# Patient Record
Sex: MALE | Race: BLACK OR AFRICAN AMERICAN | NOT HISPANIC OR LATINO | Employment: OTHER | URBAN - METROPOLITAN AREA
[De-identification: names, ages, dates, MRNs, and addresses within clinical notes are randomized per-mention and may not be internally consistent; named-entity substitution may affect disease eponyms.]

---

## 2024-04-08 ENCOUNTER — OFFICE VISIT (OUTPATIENT)
Dept: FAMILY MEDICINE | Facility: CLINIC | Age: 54
End: 2024-04-08
Payer: MEDICARE

## 2024-04-08 VITALS
WEIGHT: 179 LBS | DIASTOLIC BLOOD PRESSURE: 88 MMHG | SYSTOLIC BLOOD PRESSURE: 138 MMHG | HEART RATE: 105 BPM | OXYGEN SATURATION: 98 % | TEMPERATURE: 99 F | BODY MASS INDEX: 28.77 KG/M2 | HEIGHT: 66 IN

## 2024-04-08 DIAGNOSIS — Z63.6 CAREGIVER STRESS: ICD-10-CM

## 2024-04-08 DIAGNOSIS — Z12.5 SCREENING PSA (PROSTATE SPECIFIC ANTIGEN): ICD-10-CM

## 2024-04-08 DIAGNOSIS — R79.9 ABNORMAL FINDING OF BLOOD CHEMISTRY, UNSPECIFIED: ICD-10-CM

## 2024-04-08 DIAGNOSIS — R45.89 DYSPHORIC MOOD: ICD-10-CM

## 2024-04-08 DIAGNOSIS — Z76.89 ESTABLISHING CARE WITH NEW DOCTOR, ENCOUNTER FOR: Primary | ICD-10-CM

## 2024-04-08 DIAGNOSIS — H18.609 KERATOCONUS, UNSPECIFIED LATERALITY: ICD-10-CM

## 2024-04-08 DIAGNOSIS — I10 HYPERTENSION, UNSPECIFIED TYPE: ICD-10-CM

## 2024-04-08 DIAGNOSIS — Z00.00 HEALTHCARE MAINTENANCE: ICD-10-CM

## 2024-04-08 PROCEDURE — 1160F RVW MEDS BY RX/DR IN RCRD: CPT | Mod: CPTII,S$GLB,, | Performed by: INTERNAL MEDICINE

## 2024-04-08 PROCEDURE — 3008F BODY MASS INDEX DOCD: CPT | Mod: CPTII,S$GLB,, | Performed by: INTERNAL MEDICINE

## 2024-04-08 PROCEDURE — 99999 PR PBB SHADOW E&M-NEW PATIENT-LVL V: CPT | Mod: PBBFAC,,, | Performed by: INTERNAL MEDICINE

## 2024-04-08 PROCEDURE — 3075F SYST BP GE 130 - 139MM HG: CPT | Mod: CPTII,S$GLB,, | Performed by: INTERNAL MEDICINE

## 2024-04-08 PROCEDURE — 1159F MED LIST DOCD IN RCRD: CPT | Mod: CPTII,S$GLB,, | Performed by: INTERNAL MEDICINE

## 2024-04-08 PROCEDURE — 3079F DIAST BP 80-89 MM HG: CPT | Mod: CPTII,S$GLB,, | Performed by: INTERNAL MEDICINE

## 2024-04-08 PROCEDURE — 99204 OFFICE O/P NEW MOD 45 MIN: CPT | Mod: S$GLB,,, | Performed by: INTERNAL MEDICINE

## 2024-04-08 RX ORDER — LISINOPRIL 10 MG/1
10 TABLET ORAL DAILY
COMMUNITY
End: 2024-04-08 | Stop reason: SDUPTHER

## 2024-04-08 RX ORDER — LISINOPRIL 10 MG/1
10 TABLET ORAL DAILY
Qty: 90 TABLET | Refills: 0 | Status: SHIPPED | OUTPATIENT
Start: 2024-04-08

## 2024-04-08 SDOH — SOCIAL DETERMINANTS OF HEALTH (SDOH): DEPENDENT RELATIVE NEEDING CARE AT HOME: Z63.6

## 2024-04-08 NOTE — PATIENT INSTRUCTIONS
Please call the psychiatry clinic to schedule an appointment.    [facility] [place]: [phone]  [facility] [place]: [phone]

## 2024-04-08 NOTE — PROGRESS NOTES
HISTORY OF PRESENT ILLNESS:  Refugio Shukla Jr. is a 53 y.o. male who presents to the clinic today for Establish Care and Referral (Referral to Eye, dentist and Mental Health)    First encounter with me.  He moved recently from [place].  He is living with elderly parents whom he is helping to care for including father with early stages of dementia.  Notes some caregiver stress.    Has h/o HTN.  Out of lisinopril x one month.    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  Review of patient's allergies indicates:  No Known Allergies         CARE TEAM:  Patient Care Team:  Shahid Alejandro MD as PCP - General (Internal Medicine)         REVIEW OF SYSTEMS:  Review of Systems   Constitutional:  Negative for chills and fever.   HENT:  Negative for congestion and postnasal drip.    Eyes:  Negative for photophobia and visual disturbance.   Respiratory:  Negative for cough and shortness of breath.    Cardiovascular:  Negative for chest pain and palpitations.   Gastrointestinal:  Negative for nausea and vomiting.   Genitourinary:  Negative for dysuria and frequency.   Musculoskeletal:  Negative for  back pain and joint swelling.   Skin:  Negative for rash and wound.   Neurological:  Negative for light-headedness and headaches.   Psychiatric/Behavioral:  Positive for dysphoric mood (overwhelmed with caring for elderly parents.). The patient is not nervous/anxious.          PHYSICAL EXAM:  Vitals:    04/08/24 1041   BP: 138/88   Pulse:    Temp:              Body mass index is 28.89 kg/m².    Physical Examination: General appearance - alert, well appearing, and in no distress  Mental status - normal mood, behavior, speech, dress, motor activity, and thought processes  Eyes - sclera anicteric, left eye normal, right eye normal  Ears - bilateral TM's and external ear canals normal  Mouth - mucous membranes moist, pharynx normal without lesions  Chest - clear to auscultation, no wheezes, rales or rhonchi, symmetric air entry  Heart - normal rate, regular rhythm, normal S1, S2, no murmurs, rubs, clicks or gallops  Neurological - alert, oriented, normal speech, no focal findings or movement disorder noted  Extremities - peripheral pulses normal, no pedal edema, no clubbing or cyanosis       ASSESSMENT AND PLAN:  Establishing care with new doctor, encounter for  Healthcare maintenance  -     Hemoglobin A1C; Future; Expected date: 04/08/2024  -     Comprehensive Metabolic Panel; Future; Expected date: 04/08/2024  -     Lipid Panel; Future; Expected date: 04/08/2024  -     CBC Auto Differential; Future; Expected date: 04/08/2024  -     TSH; Future; Expected date: 04/08/2024  -     Vitamin D; Future; Expected date: 04/08/2024  -     PSA, Screening; Future; Expected date: 04/08/2024  -     Hepatitis C Antibody; Future; Expected date: 04/08/2024  -     HIV 1/2 Ag/Ab (4th Gen); Future; Expected date: 04/08/2024    Hypertension, unspecified type  -     lisinopriL 10 MG tablet; Take 1 tablet (10 mg total) by mouth once daily.  Dispense: 90 tablet; Refill: 0  - Advised for physical activity and DASH eating plan.    Screening PSA  (prostate specific antigen)  -     PSA, Screening; Future; Expected date: 04/08/2024    Abnormal finding of blood chemistry, unspecified  -     Hemoglobin A1C; Future; Expected date: 04/08/2024  -     Lipid Panel; Future; Expected date: 04/08/2024  -     CBC Auto Differential; Future; Expected date: 04/08/2024  -     TSH; Future; Expected date: 04/08/2024  -     Vitamin D; Future; Expected date: 04/08/2024    Keratoconus, unspecified laterality  -     Ambulatory referral/consult to Ophthalmology; Future; Expected date: 04/15/2024    Caregiver stress  -     Ambulatory referral/consult to Psychiatry; Future; Expected date: 04/15/2024    Dysphoric mood  -     Ambulatory referral/consult to Psychiatry; Future; Expected date: 04/15/2024              Follow up 6 months or sooner as needed.

## 2024-05-28 DIAGNOSIS — Z00.00 ENCOUNTER FOR MEDICARE ANNUAL WELLNESS EXAM: ICD-10-CM

## 2025-01-30 DIAGNOSIS — Z00.00 ENCOUNTER FOR MEDICARE ANNUAL WELLNESS EXAM: ICD-10-CM
